# Patient Record
(demographics unavailable — no encounter records)

---

## 2024-11-11 NOTE — HISTORY OF PRESENT ILLNESS
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   Feels well at this time.  No SOB, chest pain, YOUSSEF, orthopnea, PND.   CARDIOMETABOLIC RISK FACTORS: Family History:  Father - MI in his 30s, passed away from non alcoholic liver disease Mother - none sig Paternal uncles - coronary disease Mat GF - CVA  Sisters - none sig  Lifestyle History:   -Mediterranean Diet Score (9 question survey) was 5.       (8-9: optimal, 6-7: near-optimal, 4-5: suboptimal, 0-3: markedly suboptimal)   -Self-described diet:   -Exercise: YES mild-strenuous for at least 120 mins/week .   -Smoking: N   -EtOH: Y 1-7drinks/week   -Stress: moderate   -Sleep apnea: neg   Do you have polycystic ovarian syndrome? N Have you ever been pregnant? N If so, how many times? Did you have any complications during pregnancy? n/a Did you have any postpartum complications? n/a Have you had any miscarriages? N If so, how many? Have you gone through menopause? N If yes, at what age? Did you receive hormone replacement? n/a

## 2024-11-11 NOTE — DISCUSSION/SUMMARY
[FreeTextEntry1] : #Family history of early heart disease #History of HLD #ASCVD Risk reduction - will check lipids, a1c, Lp(a), CMP - encouraged patient to continue healthy exercise and eating habits, focusing on a mostly plant based diet, Mediterranean style of eating and aiming for the recommended 150 minutes per week of moderate physical activity  [EKG obtained to assist in diagnosis and management of assessed problem(s)] : EKG obtained to assist in diagnosis and management of assessed problem(s)

## 2024-11-11 NOTE — PHYSICAL EXAM
[Well Developed] : well developed [Well Nourished] : well nourished [No Acute Distress] : no acute distress [Normal Conjunctiva] : normal conjunctiva [Normal Venous Pressure] : normal venous pressure [No Carotid Bruit] : no carotid bruit [Normal S1, S2] : normal S1, S2 [No Murmur] : no murmur [No Rub] : no rub [No Gallop] : no gallop [Clear Lung Fields] : clear lung fields [Good Air Entry] : good air entry [No Respiratory Distress] : no respiratory distress  [Soft] : abdomen soft [Non Tender] : non-tender [No Masses/organomegaly] : no masses/organomegaly [Normal Bowel Sounds] : normal bowel sounds [No Edema] : no edema [No Cyanosis] : no cyanosis [No Clubbing] : no clubbing [No Varicosities] : no varicosities [No Rash] : no rash [No Skin Lesions] : no skin lesions [Moves all extremities] : moves all extremities [Normal Speech] : normal speech [Alert and Oriented] : alert and oriented [Normal memory] : normal memory

## 2024-11-11 NOTE — END OF VISIT
[] : Fellow [FreeTextEntry3] : Patient seen and examined. Case discussed with preventive cardiology fellow. Agree with plan as detailed above.  [Time Spent: ___ minutes] : I have spent [unfilled] minutes of time on the encounter which excludes teaching and separately reported services.